# Patient Record
Sex: FEMALE | Race: WHITE
[De-identification: names, ages, dates, MRNs, and addresses within clinical notes are randomized per-mention and may not be internally consistent; named-entity substitution may affect disease eponyms.]

---

## 2019-06-05 ENCOUNTER — HOSPITAL ENCOUNTER (INPATIENT)
Dept: HOSPITAL 56 - MW.ED | Age: 79
LOS: 3 days | Discharge: HOME | DRG: 202 | End: 2019-06-08
Attending: INTERNAL MEDICINE | Admitting: INTERNAL MEDICINE
Payer: COMMERCIAL

## 2019-06-05 DIAGNOSIS — I25.2: ICD-10-CM

## 2019-06-05 DIAGNOSIS — R06.02: ICD-10-CM

## 2019-06-05 DIAGNOSIS — J45.21: Primary | ICD-10-CM

## 2019-06-05 DIAGNOSIS — Z90.89: ICD-10-CM

## 2019-06-05 DIAGNOSIS — R11.0: ICD-10-CM

## 2019-06-05 DIAGNOSIS — K51.90: ICD-10-CM

## 2019-06-05 DIAGNOSIS — I25.10: ICD-10-CM

## 2019-06-05 DIAGNOSIS — Z79.890: ICD-10-CM

## 2019-06-05 DIAGNOSIS — I48.91: ICD-10-CM

## 2019-06-05 DIAGNOSIS — E78.00: ICD-10-CM

## 2019-06-05 DIAGNOSIS — R09.02: ICD-10-CM

## 2019-06-05 DIAGNOSIS — Z79.899: ICD-10-CM

## 2019-06-05 DIAGNOSIS — K21.9: ICD-10-CM

## 2019-06-05 DIAGNOSIS — Z90.49: ICD-10-CM

## 2019-06-05 DIAGNOSIS — I10: ICD-10-CM

## 2019-06-05 DIAGNOSIS — R05: ICD-10-CM

## 2019-06-05 DIAGNOSIS — Z87.891: ICD-10-CM

## 2019-06-05 DIAGNOSIS — E03.9: ICD-10-CM

## 2019-06-05 LAB
CHLORIDE SERPL-SCNC: 101 MMOL/L (ref 98–107)
SODIUM SERPL-SCNC: 138 MMOL/L (ref 136–145)

## 2019-06-05 PROCEDURE — 84443 ASSAY THYROID STIM HORMONE: CPT

## 2019-06-05 PROCEDURE — 96375 TX/PRO/DX INJ NEW DRUG ADDON: CPT

## 2019-06-05 PROCEDURE — 87205 SMEAR GRAM STAIN: CPT

## 2019-06-05 PROCEDURE — 93005 ELECTROCARDIOGRAM TRACING: CPT

## 2019-06-05 PROCEDURE — 85025 COMPLETE CBC W/AUTO DIFF WBC: CPT

## 2019-06-05 PROCEDURE — 80053 COMPREHEN METABOLIC PANEL: CPT

## 2019-06-05 PROCEDURE — 36415 COLL VENOUS BLD VENIPUNCTURE: CPT

## 2019-06-05 PROCEDURE — 71045 X-RAY EXAM CHEST 1 VIEW: CPT

## 2019-06-05 PROCEDURE — A4217 STERILE WATER/SALINE, 500 ML: HCPCS

## 2019-06-05 PROCEDURE — 94640 AIRWAY INHALATION TREATMENT: CPT

## 2019-06-05 PROCEDURE — 87070 CULTURE OTHR SPECIMN AEROBIC: CPT

## 2019-06-05 PROCEDURE — 96376 TX/PRO/DX INJ SAME DRUG ADON: CPT

## 2019-06-05 PROCEDURE — 83735 ASSAY OF MAGNESIUM: CPT

## 2019-06-05 PROCEDURE — 80048 BASIC METABOLIC PNL TOTAL CA: CPT

## 2019-06-05 PROCEDURE — 96372 THER/PROPH/DIAG INJ SC/IM: CPT

## 2019-06-05 PROCEDURE — G0378 HOSPITAL OBSERVATION PER HR: HCPCS

## 2019-06-05 PROCEDURE — 84484 ASSAY OF TROPONIN QUANT: CPT

## 2019-06-05 PROCEDURE — 99285 EMERGENCY DEPT VISIT HI MDM: CPT

## 2019-06-05 PROCEDURE — 96365 THER/PROPH/DIAG IV INF INIT: CPT

## 2019-06-05 PROCEDURE — 83880 ASSAY OF NATRIURETIC PEPTIDE: CPT

## 2019-06-05 RX ADMIN — FLUTICASONE PROPIONATE AND SALMETEROL SCH PUFF: 50; 250 POWDER RESPIRATORY (INHALATION) at 21:34

## 2019-06-05 RX ADMIN — METHYLPREDNISOLONE SODIUM SUCCINATE SCH MG: 125 INJECTION, POWDER, FOR SOLUTION INTRAMUSCULAR; INTRAVENOUS at 18:23

## 2019-06-05 RX ADMIN — DEXTROSE SCH UNITS: 10 SOLUTION INTRAVENOUS at 17:11

## 2019-06-05 NOTE — EDM.PDOC
ED HPI GENERAL MEDICAL PROBLEM





- General


Stated Complaint: SOB


Time Seen by Provider: 06/05/19 12:47


Source of Information: Reports: Patient


History Limitations: Reports: No Limitations





- History of Present Illness


INITIAL COMMENTS - FREE TEXT/NARRATIVE: 


HISTORY AND PHYSICAL:





History of present illness:


Patient is a 79-year-old female who presents to the emergency room today with 

complaints of shortness of breath 9-10 days. She states that she has had a 

productive cough which started out as "thick green mucus and has now turned 

orange". She states she does feel like today is improved. Her shortness of 

breath has "eased up" since taking over-the-counter Mucinex. Mild nausea 

associated with this, which she believes from all the cough drops she's been 

eating. She states she has had asthma attacks in the past, has albuterol 

inhaler (which has not helped alleviate her symptoms). Physical activity 

worsens her symptoms, rest improves her shortness of breath. Patient denies any 

fever, chills, headache, change in vision, syncope or near syncope. Denies any 

chest pain, back pain, abdominal pain, vomiting, diarrhea, constipation or 

dysuria. Has not noted any blood in urine or stool. Patient has been eating and 

drinking appropriately. No history of smoking.





Review of systems: 


As per history of present illness and below otherwise all systems reviewed and 

negative.





Past medical history: 


As per history of present illness and as reviewed below otherwise 

noncontributory.





Surgical history: 


As per history of present illness and as reviewed below otherwise 

noncontributory.





Social history: 


See social history for further information





Family history: 


As per history of present illness and as reviewed below otherwise 

noncontributory.





Physical exam:


General: Well-developed and well-nourished 79-year-old female. Alert and 

oriented. Nontoxic appearing and in no acute distress.


HEENT: Atraumatic, normocephalic, pupils equal and reactive bilaterally, 

negative for conjunctival pallor or scleral icterus, mucous membranes moist, 

trachea midline. No drooling or trismus noted. No meningeal signs. No hot 

potato voice noted. 


Lungs: Expiratory wheezing noted to the anterior upper airways bilaterally with 

diminished lower bases, breath sounds equal bilaterally, chest nontender. Dry 

harsh cough noted. 


Heart: S1S2, regular rate and rhythm without overt murmur


Abdomen: Soft, nondistended, nontender. Negative for masses or 

hepatosplenomegaly. Negative for costovertebral tenderness.


Pelvis: Stable nontender.


Skin: Intact, warm, dry. No lesions or rashes noted.


Extremities: Atraumatic, moves all extremities per self without difficulty or 

deficits, negative for cords or calf pain. Neurovascular unremarkable.


Neuro: Awake, alert, oriented. Cranial nerves II through XII unremarkable. 

Cerebellum unremarkable. Motor and sensory unremarkable throughout. Exam 

nonfocal.





Notes:


Upon arrival patient is 88% on room air. She does not use home oxygen. She is 

agreeable to lab work and chest x-ray.


Patient did feel some improvement after DuoNeb and Solu-Medrol IV. Lab work has 

returned and has been reviewed by me. Patient's oxygen saturation remains 88-89

% on room air. With 2 L per nasal cannula she does pop to 95%. She is agreeable 

staying for observation. Dr Ferguson was consulted on this case and agreeable to 

observe her.





Diagnostics:


CBC, CMP, troponin, EKG, one view chest, BNP





Therapeutics:


DuoNeb, Solu-Medrol, Levaquin, Phenergan w/ Codeine





Impression: 


Hypoxia


Infiltrate, left lower lobe





Plan:


Observation admission to Med/Surg





Definitive disposition and diagnosis as appropriate pending reevaluation and 

review of above.








- Related Data


 Allergies











Allergy/AdvReac Type Severity Reaction Status Date / Time


 


No Known Allergies Allergy   Verified 06/05/19 12:52











Home Meds: 


 Home Meds





Albuterol [Proair HFA] 2 inhalation IH Q4H PRN 09/10/14 [History]


Atenolol 50 mg PO DAILY 09/10/14 [History]


Citalopram [Citalopram HBr] 20 mg PO DAILY 09/10/14 [History]


Hyoscyamine [Hyomax-SL] 1 tab PO Q8H PRN 06/05/19 [History]


Levothyroxine 25 mcg PO DAILY 06/05/19 [History]


Losartan [Cozaar] 100 mg PO DAILY 06/05/19 [History]


Pantoprazole [ProTONIX***] 40 mg PO DAILY 06/05/19 [History]











ED ROS GENERAL





- Review of Systems


Review Of Systems: ROS reveals no pertinent complaints other than HPI.





ED EXAM, GENERAL





- Physical Exam


Exam: See Below (See dictation)





Course





- Vital Signs


Last Recorded V/S: 


 Last Vital Signs











Temp  98.4 F   06/05/19 12:49


 


Pulse  78   06/05/19 12:49


 


Resp  22 H  06/05/19 12:49


 


BP  152/107 H  06/05/19 12:49


 


Pulse Ox  97   06/05/19 13:07














- Orders/Labs/Meds


Orders: 


 Active Orders 24 hr











 Category Date Time Status


 


 EKG Documentation Completion [RC] STAT Care  06/05/19 12:46 Active


 


 RT Aerosol Therapy [RC] ASDIRECTED Care  06/05/19 12:53 Active


 


 RT Aerosol Therapy [RC] ASDIRECTED Care  06/05/19 13:11 Active


 


 B-TYPE NATRIURETIC PEPTIDE,BNP [CHEM] Stat Lab  06/05/19 12:58 Received


 


 Levofloxacin/Dextrose 5%-Water [Levaquin in D5W 750 MG/ Med  06/05/19 13:59 

Active





 150 ML] 750 mg   





 Premix Bag 1 bag   





 IV ONETIME   


 


 Sodium Chloride 0.9% [Saline Flush] Med  06/05/19 12:46 Active





 10 ml FLUSH ASDIRECTED PRN   


 


 Sodium Chloride 0.9% [Saline Flush] Med  06/05/19 12:46 Active





 2.5 ml FLUSH ASDIRECTED PRN   


 


 Saline Lock Insert [OM.PC] Stat Oth  06/05/19 12:46 Ordered








 Medication Orders





Levofloxacin/Dextrose 750 mg/ (Premix)  150 mls @ 100 mls/hr IV ONETIME ONE


   Stop: 06/05/19 15:28


Sodium Chloride (Saline Flush)  10 ml FLUSH ASDIRECTED PRN


   PRN Reason: Keep Vein Open


Sodium Chloride (Saline Flush)  2.5 ml FLUSH ASDIRECTED PRN


   PRN Reason: Keep Vein Open








Labs: 


 Laboratory Tests











  06/05/19 06/05/19 Range/Units





  12:58 12:58 


 


WBC  11.18 H   (4.0-11.0)  K/uL


 


RBC  4.04 L   (4.30-5.90)  M/uL


 


Hgb  12.1   (12.0-16.0)  g/dL


 


Hct  38.0   (36.0-46.0)  %


 


MCV  94.1   (80.0-98.0)  fL


 


MCH  30.0   (27.0-32.0)  pg


 


MCHC  31.8   (31.0-37.0)  g/dL


 


RDW Std Deviation  44.1   (28.0-62.0)  fl


 


RDW Coeff of Kit  13   (11.0-15.0)  %


 


Plt Count  225   (150-400)  K/uL


 


MPV  9.60   (7.40-12.00)  fL


 


Neut % (Auto)  79.9   (48.0-80.0)  %


 


Lymph % (Auto)  8.1 L   (16.0-40.0)  %


 


Mono % (Auto)  9.7   (0.0-15.0)  %


 


Eos % (Auto)  2.0   (0.0-7.0)  %


 


Baso % (Auto)  0.3   (0.0-1.5)  %


 


Neut # (Auto)  8.9 H   (1.4-5.7)  K/uL


 


Lymph # (Auto)  0.9   (0.6-2.4)  K/uL


 


Mono # (Auto)  1.1 H   (0.0-0.8)  K/uL


 


Eos # (Auto)  0.2   (0.0-0.7)  K/uL


 


Baso # (Auto)  0.0   (0.0-0.1)  K/uL


 


Nucleated RBC %  0.0   /100WBC


 


Nucleated RBCs #  0   K/uL


 


Sodium   138  (136-145)  mmol/L


 


Potassium   3.6  (3.5-5.1)  mmol/L


 


Chloride   101  ()  mmol/L


 


Carbon Dioxide   28.6  (21.0-32.0)  mmol/L


 


BUN   13  (7.0-18.0)  mg/dL


 


Creatinine   0.9  (0.6-1.0)  mg/dL


 


Est Cr Clr Drug Dosing   46.36  mL/min


 


Estimated GFR (MDRD)   > 60.0  ml/min


 


Glucose   112 H  ()  mg/dL


 


Calcium   9.3  (8.5-10.1)  mg/dL


 


Total Bilirubin   0.4  (0.2-1.0)  mg/dL


 


AST   13 L  (15-37)  IU/L


 


ALT   13 L  (14-63)  IU/L


 


Alkaline Phosphatase   72  ()  U/L


 


Troponin I   < 0.050  (0.000-0.056)  ng/mL


 


Total Protein   7.2  (6.4-8.2)  g/dL


 


Albumin   2.7 L  (3.4-5.0)  g/dL


 


Globulin   4.5 H  (2.6-4.0)  g/dL


 


Albumin/Globulin Ratio   0.6 L  (0.9-1.6)  











Meds: 


Medications











Generic Name Dose Route Start Last Admin





  Trade Name Jonathanq  PRN Reason Stop Dose Admin


 


Levofloxacin/Dextrose 750 mg/  150 mls @ 100 mls/hr  06/05/19 13:59  





  Premix  IV  06/05/19 15:28  





  ONETIME ONE   





     





     





     





     


 


Sodium Chloride  10 ml  06/05/19 12:46  





  Saline Flush  FLUSH   





  ASDIRECTED PRN   





  Keep Vein Open   





     





     





     


 


Sodium Chloride  2.5 ml  06/05/19 12:46  





  Saline Flush  FLUSH   





  ASDIRECTED PRN   





  Keep Vein Open   





     





     





     














Discontinued Medications














Generic Name Dose Route Start Last Admin





  Trade Name Jonathanq  PRN Reason Stop Dose Admin


 


Albuterol/Ipratropium  3 ml  06/05/19 12:53  06/05/19 13:06





  Duoneb 3.0-0.5 Mg/3 Ml  NEB  06/05/19 12:54  3 ml





  ONETIME ONE   Administration





     





     





     





     


 


Albuterol/Ipratropium  3 ml  06/05/19 13:11  06/05/19 13:16





  Duoneb 3.0-0.5 Mg/3 Ml  NEB  06/05/19 13:12  3 ml





  ONETIME ONE   Administration





     





     





     





     


 


Albuterol/Ipratropium  Confirm  06/05/19 13:12  06/05/19 13:15





  Duoneb 3.0-0.5 Mg/3 Ml  Administered  06/05/19 13:13  Not Given





  Dose   





  3 ml   





  .ROUTE   





  .STK-MED ONE   





     





     





     





     


 


Methylprednisolone Sodium Succinate  125 mg  06/05/19 12:53  06/05/19 13:10





  Solu-Medrol  IVPUSH  06/05/19 12:54  125 mg





  ONETIME ONE   Administration





     





     





     





     


 


Promethazine HCl/Codeine  5 ml  06/05/19 13:59  





  Phenergan With Codeine  PO  06/05/19 14:00  





  NOW STA   





     





     





     





     














Departure





- Departure


Time of Disposition: 14:26


Disposition: Refer to Observation


Clinical Impression: 


 Hypoxia, Infiltrate of left lung present on chest x-ray





Referrals: 


PCP,None [Primary Care Provider] - 





- My Orders


Last 24 Hours: 


My Active Orders





06/05/19 12:46


EKG Documentation Completion [RC] STAT 


Sodium Chloride 0.9% [Saline Flush]   10 ml FLUSH ASDIRECTED PRN 


Sodium Chloride 0.9% [Saline Flush]   2.5 ml FLUSH ASDIRECTED PRN 


Saline Lock Insert [OM.PC] Stat 





06/05/19 12:53


RT Aerosol Therapy [RC] ASDIRECTED 





06/05/19 12:58


B-TYPE NATRIURETIC PEPTIDE,BNP [CHEM] Stat 





06/05/19 13:11


RT Aerosol Therapy [RC] ASDIRECTED 





06/05/19 13:59


Levofloxacin/Dextrose 5%-Water [Levaquin in D5W 750 MG/150 ML] 750 mg   Premix 

Bag 1 bag IV ONETIME 














- Assessment/Plan


Last 24 Hours: 


My Active Orders





06/05/19 12:46


EKG Documentation Completion [RC] STAT 


Sodium Chloride 0.9% [Saline Flush]   10 ml FLUSH ASDIRECTED PRN 


Sodium Chloride 0.9% [Saline Flush]   2.5 ml FLUSH ASDIRECTED PRN 


Saline Lock Insert [OM.PC] Stat 





06/05/19 12:53


RT Aerosol Therapy [RC] ASDIRECTED 





06/05/19 12:58


B-TYPE NATRIURETIC PEPTIDE,BNP [CHEM] Stat 





06/05/19 13:11


RT Aerosol Therapy [RC] ASDIRECTED 





06/05/19 13:59


Levofloxacin/Dextrose 5%-Water [Levaquin in D5W 750 MG/150 ML] 750 mg   Premix 

Bag 1 bag IV ONETIME

## 2019-06-05 NOTE — PCM.HP
H&P History of Present Illness





- General


Date of Service: 06/05/19


Admit Problem/Dx: 


 Admission Diagnosis/Problem





Admission Diagnosis/Problem      Hypoxia








Source of Information: Patient


History Limitations: Reports: No Limitations





- History of Present Illness


Initial Comments - Free Text/Narative: 





79 year old female with history of asthma, HTN, GERD, hypothyroidism, 

ulcerative colitis presented to ED with complaints of shortness of breath and 

productive cough for past several days.  On presentation her oxygen saturation 

was at 88% on RA and an elevated BP of 150s/100.  She feels her SOB started 

when she was exposed to cat dander after visiting her son recently.  She states 

her granddaughters washed her clothes for her and when it was returned it was 

full of cat hair.  Shortly after getting the clothes back she started noticing 

she was becoming more short of breath and had a cough that produced thick green 

sputum.  She states the sputum is now pale yellow in color.  She had taken 

mucinex, which she feels has helped with her breathing.  She currently does not 

take any daily medication for her asthma, but does have an albuterol inhaler.  

She states she had not used it for six months and did not even bring it on this 

trip.  Patient denies chest pain, fever, chills, and abdominal pain.  She does 

state she has some chest/back pain that she relates to her cough.  She reports 

history of smoking when she was younger, but has not smoked for many years, no 

alcohol use. 





In the ED diagnostic testing was completed that showed a slightly elevated WBC 

of 11.18, BMP was within normal limits, and CXR was negative.  Patient was 

treated with Duoneb, Solu-Medrol, and Levaquin while in the ED. IVFs were also 

initiated.  Placed on 3L oxygen via NC and oxygen saturation improved to 97%. 

She will be admitted observation for acute asthma exacerbation and possible 

bronchitis


 














- Related Data


Allergies/Adverse Reactions: 


 Allergies











Allergy/AdvReac Type Severity Reaction Status Date / Time


 


No Known Allergies Allergy   Verified 06/05/19 12:52











Home Medications: 


 Home Meds





Albuterol [Proair HFA] 2 inhalation IH Q4H PRN 09/10/14 [History]


Atenolol 50 mg PO DAILY 09/10/14 [History]


Citalopram [Citalopram HBr] 20 mg PO DAILY 09/10/14 [History]


Hyoscyamine [Hyomax-SL] 1 tab PO Q8H PRN 06/05/19 [History]


Levothyroxine 25 mcg PO DAILY 06/05/19 [History]


Losartan [Cozaar] 100 mg PO DAILY 06/05/19 [History]


Pantoprazole [ProTONIX***] 40 mg PO DAILY 06/05/19 [History]











Past Medical History


Cardiovascular History: Reports: CAD, High Cholesterol, Hypertension, MI.  

Denies: Afib, Blood Clots/VTE/DVT


Respiratory History: Reports: Asthma.  Denies: PE


Gastrointestinal History: Reports: GERD, Inflammatory Bowel Disease (Ulcerative 

colitis)


Genitourinary History: Reports: None.  Denies: Chronic Renal Insuffiency


OB/GYN History: Reports: Pregnancy


Neurological History: Reports: None.  Denies: CVA, TIA


Psychiatric History: Reports: Anxiety


Endocrine/Metabolic History: Reports: None.  Denies: Diabetes, Type II





- Past Surgical History


GI Surgical History: Reports: Appendectomy, Cholecystectomy


Female  Surgical History: Reports: Hysterectomy





Social & Family History





- Family History


Family Medical History: Noncontributory





- Tobacco Use


Smoking Status *Q: Former Smoker


Used Tobacco, but Quit: Yes


Month/Year Tobacco Last Used: 50+ years ago





- Alcohol Use


Alcohol Use History: No





- Recreational Drug Use


Recreational Drug Use: No





- Living Situation & Occupation


Living situation: Reports: 


Occupation: Retired





H&P Review of Systems





- Review of Systems:


Review Of Systems: See Below


General: Reports: No Symptoms.  Denies: Fever, Chills, Malaise, Weakness


HEENT: Denies: Headaches, Sinus Congestion, Sore Throat


Pulmonary: Reports: Shortness of Breath, Wheezing, Pleuritic Chest Pain, Cough, 

Sputum (dark green to pale yellow now).  Denies: Hemoptysis


Cardiovascular: Reports: No Symptoms.  Denies: Chest Pain, Edema


Gastrointestinal: Reports: No Symptoms, Diarrhea (baseline with UC).  Denies: 

Abdominal Pain, Black Stool, Bloody Stool, Decreased Appetite, Nausea


Genitourinary: Reports: No Symptoms.  Denies: Dysuria, Frequency, Burning


Skin: Reports: No Symptoms


Neurological: Reports: No Symptoms


Hematologic/Lymphatic: Reports: No Symptoms


Immunologic: Reports: No Symptoms





Exam





- Exam


Exam: See Below





- Vital Signs


Vital Signs: 


 Last Vital Signs











Temp  98.4 F   06/05/19 12:49


 


Pulse  78   06/05/19 12:49


 


Resp  22 H  06/05/19 12:49


 


BP  152/107 H  06/05/19 12:49


 


Pulse Ox  97   06/05/19 13:07











Weight: 95.254 kg





- Exam


General: Alert, Oriented, Cooperative


HEENT: Conjunctiva Clear, Mucosa Moist & Pink, Pupils Reactive


Lungs: Rhonchi, Wheezing (inspiratory and expiratory).  No: Normal Respiratory 

Effort (dyspneic with speech)


Cardiovascular: Regular Rate, Regular Rhythm, Normal S1, Normal S2.  No: 

Systolic Murmur


GI/Abdominal Exam: Normal Bowel Sounds, Soft, Non-Tender


Extremities: Normal Inspection, Normal Range of Motion, Non-Tender, No Pedal 

Edema


Neuro Extensive - Mental Status: Alert, Oriented x3


Psychiatric: Alert, Normal Affect, Normal Mood





- Patient Data


Lab Results Last 24 hrs: 


 Laboratory Results - last 24 hr











  06/05/19 06/05/19 06/05/19 Range/Units





  12:58 12:58 12:58 


 


WBC  11.18 H    (4.0-11.0)  K/uL


 


RBC  4.04 L    (4.30-5.90)  M/uL


 


Hgb  12.1    (12.0-16.0)  g/dL


 


Hct  38.0    (36.0-46.0)  %


 


MCV  94.1    (80.0-98.0)  fL


 


MCH  30.0    (27.0-32.0)  pg


 


MCHC  31.8    (31.0-37.0)  g/dL


 


RDW Std Deviation  44.1    (28.0-62.0)  fl


 


RDW Coeff of Kit  13    (11.0-15.0)  %


 


Plt Count  225    (150-400)  K/uL


 


MPV  9.60    (7.40-12.00)  fL


 


Neut % (Auto)  79.9    (48.0-80.0)  %


 


Lymph % (Auto)  8.1 L    (16.0-40.0)  %


 


Mono % (Auto)  9.7    (0.0-15.0)  %


 


Eos % (Auto)  2.0    (0.0-7.0)  %


 


Baso % (Auto)  0.3    (0.0-1.5)  %


 


Neut # (Auto)  8.9 H    (1.4-5.7)  K/uL


 


Lymph # (Auto)  0.9    (0.6-2.4)  K/uL


 


Mono # (Auto)  1.1 H    (0.0-0.8)  K/uL


 


Eos # (Auto)  0.2    (0.0-0.7)  K/uL


 


Baso # (Auto)  0.0    (0.0-0.1)  K/uL


 


Nucleated RBC %  0.0    /100WBC


 


Nucleated RBCs #  0    K/uL


 


Sodium   138   (136-145)  mmol/L


 


Potassium   3.6   (3.5-5.1)  mmol/L


 


Chloride   101   ()  mmol/L


 


Carbon Dioxide   28.6   (21.0-32.0)  mmol/L


 


BUN   13   (7.0-18.0)  mg/dL


 


Creatinine   0.9   (0.6-1.0)  mg/dL


 


Est Cr Clr Drug Dosing   46.36   mL/min


 


Estimated GFR (MDRD)   > 60.0   ml/min


 


Glucose   112 H   ()  mg/dL


 


Calcium   9.3   (8.5-10.1)  mg/dL


 


Total Bilirubin   0.4   (0.2-1.0)  mg/dL


 


AST   13 L   (15-37)  IU/L


 


ALT   13 L   (14-63)  IU/L


 


Alkaline Phosphatase   72   ()  U/L


 


Troponin I   < 0.050   (0.000-0.056)  ng/mL


 


B-Natriuretic Peptide    386 H  (<100)  PG/ML


 


Total Protein   7.2   (6.4-8.2)  g/dL


 


Albumin   2.7 L   (3.4-5.0)  g/dL


 


Globulin   4.5 H   (2.6-4.0)  g/dL


 


Albumin/Globulin Ratio   0.6 L   (0.9-1.6)  











Result Diagrams: 


 06/05/19 12:58





 06/05/19 12:58





- Problem List


(1) Asthma exacerbation


SNOMED Code(s): 426396978


   ICD Code: J45.901 - UNSPECIFIED ASTHMA WITH (ACUTE) EXACERBATION   Status: 

Acute   Current Visit: Yes   


Qualifiers: 


   Asthma severity: mild   Asthma persistence: intermittent   Qualified Code(s)

: J45.21 - Mild intermittent asthma with (acute) exacerbation   





(2) Hypoxia


SNOMED Code(s): 017923797


   ICD Code: R09.02 - HYPOXEMIA   Status: Acute   Current Visit: Yes   





(3) HTN (hypertension)


SNOMED Code(s): 61075079


   ICD Code: I10 - ESSENTIAL (PRIMARY) HYPERTENSION   Status: Chronic   Current 

Visit: Yes   


Qualifiers: 


   Hypertension type: essential hypertension   Qualified Code(s): I10 - 

Essential (primary) hypertension   





(4) Hypothyroidism


SNOMED Code(s): 76537383


   ICD Code: E03.9 - HYPOTHYROIDISM, UNSPECIFIED   Status: Chronic   Current 

Visit: Yes   





(5) Ulcerative colitis


SNOMED Code(s): 10071123


   ICD Code: K51.90 - ULCERATIVE COLITIS, UNSPECIFIED, WITHOUT COMPLICATIONS   

Status: Chronic   Current Visit: Yes   


Problem List Initiated/Reviewed/Updated: Yes


Orders Last 24hrs: 


 Active Orders 24 hr











 Category Date Time Status


 


 Admission Status [Patient Status] [ADT] Stat ADT  06/05/19 14:27 Active


 


 EKG Documentation Completion [RC] STAT Care  06/05/19 12:46 Active


 


 RT Aerosol Therapy [RC] ASDIRECTED Care  06/05/19 12:53 Active


 


 RT Aerosol Therapy [RC] ASDIRECTED Care  06/05/19 13:11 Active


 


 CULTURE SPUTUM + SMEAR [RM] Stat Lab  06/05/19 14:32 Received


 


 Sodium Chloride 0.9% [Saline Flush] Med  06/05/19 12:46 Active





 10 ml FLUSH ASDIRECTED PRN   


 


 Sodium Chloride 0.9% [Saline Flush] Med  06/05/19 12:46 Active





 2.5 ml FLUSH ASDIRECTED PRN   


 


 Saline Lock Insert [OM.PC] Stat Oth  06/05/19 12:46 Ordered








 Medication Orders





Sodium Chloride (Saline Flush)  10 ml FLUSH ASDIRECTED PRN


   PRN Reason: Keep Vein Open


Sodium Chloride (Saline Flush)  2.5 ml FLUSH ASDIRECTED PRN


   PRN Reason: Keep Vein Open








Assessment/Plan Comment:: 





79 year old female admitted with  Acute asthma exacerbation and possible 

bronchitis





1. Acute asthma exacerbation:  Will continue treatment with Duoneb, Levaquin 

750mg IV daily, and Solu-Medrol 80 mg IV Q6hrs. Start Advair 250/50. Consult RT 

for asthma education/action plan .Oxygen therapy PRN to keep sats 90% or 

higher. Wean off as possible. 





2. HTN: Elevated on arrival, will monitor. Continue home medications, Atenolol 

and Losartan.





3. Hypothyroidism: Stable, continue Levothyroxine





4. Ulcerative Colitis: Stable, recently had polyps removed during bi annual 

colonoscopy. 





VTE prophylaxis: Heparin 5000 units subcutaneously BID





Dispo: 1-2 days 





Patient would like to be placed as a DNR for code status.

## 2019-06-05 NOTE — CR
INDICATION:



Shortness of breath.



COMPARISON:



Portable AP chest September 10, 2014.



TECHNIQUE:



Portable AP chest.



FINDINGS:



Normal size cardiac silhouette. Clear lung fields with no evidence of acute 

pneumonic infiltrates or CHF. No pneumothorax or pleural effusion.



IMPRESSION:



Negative chest.



Dictated by Tarik Mcclelland MD @ Jun 5 2019  2:07PM



Signed by Dr. Tarik Mcclelland @ Jun 5 2019  2:08PM

## 2019-06-06 LAB
CHLORIDE SERPL-SCNC: 102 MMOL/L (ref 98–107)
SODIUM SERPL-SCNC: 138 MMOL/L (ref 136–145)

## 2019-06-06 RX ADMIN — METHYLPREDNISOLONE SODIUM SUCCINATE SCH MG: 125 INJECTION, POWDER, FOR SOLUTION INTRAMUSCULAR; INTRAVENOUS at 13:29

## 2019-06-06 RX ADMIN — DEXTROSE SCH UNITS: 10 SOLUTION INTRAVENOUS at 05:12

## 2019-06-06 RX ADMIN — DEXTROSE SCH UNITS: 10 SOLUTION INTRAVENOUS at 16:03

## 2019-06-06 RX ADMIN — METHYLPREDNISOLONE SODIUM SUCCINATE SCH MG: 125 INJECTION, POWDER, FOR SOLUTION INTRAMUSCULAR; INTRAVENOUS at 01:20

## 2019-06-06 RX ADMIN — METHYLPREDNISOLONE SODIUM SUCCINATE SCH MG: 125 INJECTION, POWDER, FOR SOLUTION INTRAMUSCULAR; INTRAVENOUS at 18:52

## 2019-06-06 RX ADMIN — FLUTICASONE PROPIONATE AND SALMETEROL SCH: 50; 250 POWDER RESPIRATORY (INHALATION) at 22:51

## 2019-06-06 RX ADMIN — FLUTICASONE PROPIONATE AND SALMETEROL SCH PUFF: 50; 250 POWDER RESPIRATORY (INHALATION) at 09:59

## 2019-06-06 RX ADMIN — METHYLPREDNISOLONE SODIUM SUCCINATE SCH MG: 125 INJECTION, POWDER, FOR SOLUTION INTRAMUSCULAR; INTRAVENOUS at 06:08

## 2019-06-06 NOTE — PCM.PN
- General Info


Date of Service: 06/06/19


Admission Dx/Problem (Free Text): 


 Admission Diagnosis/Problem





Admission Diagnosis/Problem      Hypoxia








Subjective Update: 





Feeling improved, but still not 100%. No chest pain, reports dyspnea still, 

productive cough. 


Functional Status: Reports: Pain Controlled, Tolerating Diet, Ambulating, 

Urinating





- Review of Systems


General: Reports: Malaise.  Denies: Fever


HEENT: Reports: No Symptoms.  Denies: Headaches, Sore Throat


Pulmonary: Reports: Shortness of Breath, Pleuritic Chest Pain, Cough, Sputum, 

Wheezing


Cardiovascular: Reports: No Symptoms.  Denies: Chest Pain


Gastrointestinal: Reports: No Symptoms.  Denies: Abdominal Pain, Nausea, 

Vomiting


Genitourinary: Reports: No Symptoms.  Denies: Dysuria, Frequency, Burning


Musculoskeletal: Reports: No Symptoms


Skin: Reports: No Symptoms


Neurological: Reports: No Symptoms


Psychiatric: Reports: No Symptoms





- Patient Data


Vitals - Most Recent: 


 Last Vital Signs











Temp  98.3 F   06/06/19 07:42


 


Pulse  103 H  06/06/19 08:39


 


Resp  18   06/06/19 07:42


 


BP  159/89 H  06/06/19 08:39


 


Pulse Ox  94 L  06/06/19 07:42











Weight - Most Recent: 95.254 kg


I&O - Last 24 Hours: 


 Intake & Output











 06/05/19 06/06/19 06/06/19





 22:59 06:59 14:59


 


Intake Total 100 480 


 


Output Total 0 500 


 


Balance 100 -20 











Lab Results Last 24 Hours: 


 Laboratory Results - last 24 hr











  06/05/19 06/05/19 06/05/19 Range/Units





  12:58 12:58 12:58 


 


WBC  11.18 H    (4.0-11.0)  K/uL


 


RBC  4.04 L    (4.30-5.90)  M/uL


 


Hgb  12.1    (12.0-16.0)  g/dL


 


Hct  38.0    (36.0-46.0)  %


 


MCV  94.1    (80.0-98.0)  fL


 


MCH  30.0    (27.0-32.0)  pg


 


MCHC  31.8    (31.0-37.0)  g/dL


 


RDW Std Deviation  44.1    (28.0-62.0)  fl


 


RDW Coeff of Kit  13    (11.0-15.0)  %


 


Plt Count  225    (150-400)  K/uL


 


MPV  9.60    (7.40-12.00)  fL


 


Neut % (Auto)  79.9    (48.0-80.0)  %


 


Lymph % (Auto)  8.1 L    (16.0-40.0)  %


 


Mono % (Auto)  9.7    (0.0-15.0)  %


 


Eos % (Auto)  2.0    (0.0-7.0)  %


 


Baso % (Auto)  0.3    (0.0-1.5)  %


 


Neut # (Auto)  8.9 H    (1.4-5.7)  K/uL


 


Lymph # (Auto)  0.9    (0.6-2.4)  K/uL


 


Mono # (Auto)  1.1 H    (0.0-0.8)  K/uL


 


Eos # (Auto)  0.2    (0.0-0.7)  K/uL


 


Baso # (Auto)  0.0    (0.0-0.1)  K/uL


 


Add Manual Diff     


 


Neutrophils % (Manual)     (48.0-80.0)  %


 


Band Neutrophils %     %


 


Lymphocytes % (Manual)     (16.0-40.0)  %


 


Monocytes % (Manual)     (0.0-15.0)  %


 


Nucleated RBC %  0.0    /100WBC


 


Absolute Seg Neuts     (1.4-5.7)  


 


Band Neutrophils #     


 


Lymphocytes # (Manual)     (0.6-2.4)  


 


Monocytes # (Manual)     (0.0-0.8)  


 


Nucleated RBCs #  0    K/uL


 


Sodium   138   (136-145)  mmol/L


 


Potassium   3.6   (3.5-5.1)  mmol/L


 


Chloride   101   ()  mmol/L


 


Carbon Dioxide   28.6   (21.0-32.0)  mmol/L


 


BUN   13   (7.0-18.0)  mg/dL


 


Creatinine   0.9   (0.6-1.0)  mg/dL


 


Est Cr Clr Drug Dosing   46.36   mL/min


 


Estimated GFR (MDRD)   > 60.0   ml/min


 


Glucose   112 H   ()  mg/dL


 


Calcium   9.3   (8.5-10.1)  mg/dL


 


Total Bilirubin   0.4   (0.2-1.0)  mg/dL


 


AST   13 L   (15-37)  IU/L


 


ALT   13 L   (14-63)  IU/L


 


Alkaline Phosphatase   72   ()  U/L


 


Troponin I   < 0.050   (0.000-0.056)  ng/mL


 


B-Natriuretic Peptide    386 H  (<100)  PG/ML


 


Total Protein   7.2   (6.4-8.2)  g/dL


 


Albumin   2.7 L   (3.4-5.0)  g/dL


 


Globulin   4.5 H   (2.6-4.0)  g/dL


 


Albumin/Globulin Ratio   0.6 L   (0.9-1.6)  














  06/06/19 06/06/19 Range/Units





  06:00 06:00 


 


WBC  10.96   (4.0-11.0)  K/uL


 


RBC  3.92 L   (4.30-5.90)  M/uL


 


Hgb  11.7 L   (12.0-16.0)  g/dL


 


Hct  36.7   (36.0-46.0)  %


 


MCV  93.6   (80.0-98.0)  fL


 


MCH  29.8   (27.0-32.0)  pg


 


MCHC  31.9   (31.0-37.0)  g/dL


 


RDW Std Deviation  43.0   (28.0-62.0)  fl


 


RDW Coeff of Kit  13   (11.0-15.0)  %


 


Plt Count  234   (150-400)  K/uL


 


MPV  9.80   (7.40-12.00)  fL


 


Neut % (Auto)    (48.0-80.0)  %


 


Lymph % (Auto)    (16.0-40.0)  %


 


Mono % (Auto)    (0.0-15.0)  %


 


Eos % (Auto)    (0.0-7.0)  %


 


Baso % (Auto)    (0.0-1.5)  %


 


Neut # (Auto)    (1.4-5.7)  K/uL


 


Lymph # (Auto)    (0.6-2.4)  K/uL


 


Mono # (Auto)    (0.0-0.8)  K/uL


 


Eos # (Auto)    (0.0-0.7)  K/uL


 


Baso # (Auto)    (0.0-0.1)  K/uL


 


Add Manual Diff  YES   


 


Neutrophils % (Manual)  80   (48.0-80.0)  %


 


Band Neutrophils %  15   %


 


Lymphocytes % (Manual)  4 L   (16.0-40.0)  %


 


Monocytes % (Manual)  1   (0.0-15.0)  %


 


Nucleated RBC %  0.0   /100WBC


 


Absolute Seg Neuts  8.8 H   (1.4-5.7)  


 


Band Neutrophils #  1.6   


 


Lymphocytes # (Manual)  0.4 L   (0.6-2.4)  


 


Monocytes # (Manual)  0.1   (0.0-0.8)  


 


Nucleated RBCs #  0   K/uL


 


Sodium   138  (136-145)  mmol/L


 


Potassium   3.6  (3.5-5.1)  mmol/L


 


Chloride   102  ()  mmol/L


 


Carbon Dioxide   26.5  (21.0-32.0)  mmol/L


 


BUN   20 H  (7.0-18.0)  mg/dL


 


Creatinine   1.2 H  (0.6-1.0)  mg/dL


 


Est Cr Clr Drug Dosing   34.77  mL/min


 


Estimated GFR (MDRD)   43.4  ml/min


 


Glucose   208 H  ()  mg/dL


 


Calcium   9.3  (8.5-10.1)  mg/dL


 


Total Bilirubin    (0.2-1.0)  mg/dL


 


AST    (15-37)  IU/L


 


ALT    (14-63)  IU/L


 


Alkaline Phosphatase    ()  U/L


 


Troponin I    (0.000-0.056)  ng/mL


 


B-Natriuretic Peptide    (<100)  PG/ML


 


Total Protein    (6.4-8.2)  g/dL


 


Albumin    (3.4-5.0)  g/dL


 


Globulin    (2.6-4.0)  g/dL


 


Albumin/Globulin Ratio    (0.9-1.6)  











Robert Results Last 24 Hours: 


 Microbiology











 06/05/19 14:32 Gram Stain - Preliminary





 Sputum - Expectorated 











Med Orders - Current: 


 Current Medications





Acetaminophen (Tylenol)  650 mg PO Q4H PRN


   PRN Reason: Pain (mild 1-3)


Albuterol/Ipratropium (Duoneb 3.0-0.5 Mg/3 Ml)  3 ml NEB Q4HRRT [facility]


   Last Admin: 06/06/19 09:59 Dose:  3 ml


Atenolol (Tenormin)  50 mg PO DAILY [facility]


   Last Admin: 06/06/19 08:39 Dose:  50 mg


Benzonatate (Tessalon Perles)  100 mg PO TID PRN


   PRN Reason: Cough


Citalopram Hydrobromide (Celexa)  20 mg PO DAILY [facility]


   Last Admin: 06/06/19 08:39 Dose:  20 mg


Heparin Sodium (Porcine) (Heparin Sodium)  5,000 units SUBCUT Q12H [facility]


   Last Admin: 06/06/19 05:12 Dose:  5,000 units


Hyoscyamine (Hyomax-Sl)  0.125 mg PO Q8H PRN


   PRN Reason: Abdominal Pain


Levofloxacin/Dextrose 750 mg/ (Premix)  150 mls @ 100 mls/hr IV Q48H [facility]


Sodium Chloride (Normal Saline)  1,000 mls @ 100 mls/hr IV ASDIRECTED [facility]


Levothyroxine Sodium (Levothyroxine)  25 mcg PO ACBREAKFAST [facility]


   Last Admin: 06/06/19 08:40 Dose:  25 mcg


Losartan Potassium (Cozaar)  100 mg PO DAILY [facility]


   Last Admin: 06/06/19 08:38 Dose:  100 mg


Methylprednisolone Sodium Succinate (Solu-Medrol)  80 mg IVPUSH Q6H [facility]


   Last Admin: 06/06/19 06:08 Dose:  80 mg


Ondansetron HCl (Zofran Odt)  4 mg PO Q4H PRN


   PRN Reason: nausea, able to take PO


Pantoprazole Sodium (Protonix***)  40 mg PO DAILY@0700 [facility]


   Last Admin: 06/06/19 06:07 Dose:  40 mg


Fluticasone/Salmeterol (Advair Diskus 250-50)  1 puff INH BID [facility]


   Last Admin: 06/06/19 09:59 Dose:  1 puff


Sodium Chloride (Saline Flush)  10 ml FLUSH ASDIRECTED PRN


   PRN Reason: Keep Vein Open


Sodium Chloride (Saline Flush)  2.5 ml FLUSH ASDIRECTED PRN


   PRN Reason: Keep Vein Open





Discontinued Medications





Albuterol/Ipratropium (Duoneb 3.0-0.5 Mg/3 Ml)  3 ml NEB ONETIME ONE


   Stop: 06/05/19 12:54


   Last Admin: 06/05/19 13:06 Dose:  3 ml


Albuterol/Ipratropium (Duoneb 3.0-0.5 Mg/3 Ml)  3 ml NEB ONETIME ONE


   Stop: 06/05/19 13:12


   Last Admin: 06/05/19 13:16 Dose:  3 ml


Albuterol/Ipratropium (Duoneb 3.0-0.5 Mg/3 Ml) Confirm Administered Dose 3 ml 

.ROUTE .STK-MED ONE


   Stop: 06/05/19 13:13


   Last Admin: 06/05/19 13:15 Dose:  Not Given


Levofloxacin/Dextrose 750 mg/ (Premix)  150 mls @ 100 mls/hr IV ONETIME ONE


   Stop: 06/05/19 15:28


   Last Admin: 06/05/19 14:44 Dose:  100 mls/hr


Methylprednisolone Sodium Succinate (Solu-Medrol)  125 mg IVPUSH ONETIME ONE


   Stop: 06/05/19 12:54


   Last Admin: 06/05/19 13:10 Dose:  125 mg


Promethazine HCl/Codeine (Phenergan With Codeine)  5 ml PO NOW STA


   Stop: 06/05/19 14:00


   Last Admin: 06/05/19 14:52 Dose:  5 ml











- Exam


Quality Assessment: Supplemental Oxygen


General: Alert, Oriented, Cooperative, No Acute Distress


Lungs: Rhonchi, Wheezing.  No: Normal Respiratory Effort (dyspneic with speech)


Cardiovascular: Regular Rate, Regular Rhythm


GI/Abdominal Exam: Normal Bowel Sounds, Soft, Non-Tender, No Organomegaly


Back Exam: Normal Inspection, Full Range of Motion


Extremities: Normal Inspection, Normal Range of Motion, Non-Tender, No Pedal 

Edema


Neurological: No New Focal Deficit


Psy/Mental Status: Alert, Normal Affect, Normal Mood





- Problem List & Annotations


(1) Asthma exacerbation


SNOMED Code(s): 328947850


   Code(s): J45.901 - UNSPECIFIED ASTHMA WITH (ACUTE) EXACERBATION   Status: 

Acute   Current Visit: Yes   


Qualifiers: 


   Asthma severity: mild   Asthma persistence: intermittent   Qualified Code(s)

: J45.21 - Mild intermittent asthma with (acute) exacerbation   





(2) Hypoxia


SNOMED Code(s): 091322753


   Code(s): R09.02 - HYPOXEMIA   Status: Acute   Current Visit: Yes   





(3) HTN (hypertension)


SNOMED Code(s): 22867007


   Code(s): I10 - ESSENTIAL (PRIMARY) HYPERTENSION   Status: Chronic   Current 

Visit: Yes   


Qualifiers: 


   Hypertension type: essential hypertension   Qualified Code(s): I10 - 

Essential (primary) hypertension   





(4) Hypothyroidism


SNOMED Code(s): 00781036


   Code(s): E03.9 - HYPOTHYROIDISM, UNSPECIFIED   Status: Chronic   Current 

Visit: Yes   





(5) Ulcerative colitis


SNOMED Code(s): 23299061


   Code(s): K51.90 - ULCERATIVE COLITIS, UNSPECIFIED, WITHOUT COMPLICATIONS   

Status: Chronic   Current Visit: Yes   





- Problem List Review


Problem List Initiated/Reviewed/Updated: Yes





- My Orders


Last 24 Hours: 


My Active Orders





06/05/19 16:17


May Shower [RC] ASDIRECTED 


Oxygen Therapy [RC] PRN 


Up ad Kaelyn [RC] ASDIRECTED 


VTE/DVT Education [RC] PER UNIT ROUTINE 


Vital Signs [RC] Q4H 


Acetaminophen [Tylenol]   650 mg PO Q4H PRN 


Ondansetron [Zofran ODT]   4 mg PO Q4H PRN 


Resuscitation Status Routine 





06/05/19 16:18


Intake and Output [RC] QSHIFT 





06/05/19 16:25


RT Aerosol Therapy [RC] ASDIRECTED 





06/05/19 16:26


Consult to Respiratory Therapy [Respiratory Care Assess and Treatment] [CONS] 

Routine 





06/05/19 16:27


RT Post Treatment Assessment [RC] Click to Edit 


RT Pre-Treatment Assessment [RC] Click to Edit 


Benzonatate [Tessalon Perles]   100 mg PO TID PRN 





06/05/19 16:28


Hyoscyamine [Hyomax-SL]   0.125 mg PO Q8H PRN 





06/05/19 16:30


Heparin Sodium   5,000 units SUBCUT Q12H 





06/05/19 18:00


Albuterol/Ipratropium [DuoNeb 3.0-0.5 MG/3 ML]   3 ml NEB Q4HRRT 





06/05/19 19:00


methylPREDNISolone Sod Succ [Solu-MEDROL]   80 mg IVPUSH Q6H 





06/05/19 21:00


Fluticasone/Salmeterol [Advair Diskus 250-50]   1 puff INH BID 





06/05/19 Dinner


Heart Healthy Diet [DIET] 





06/06/19 07:00


Pantoprazole [ProTONIX***]   40 mg PO DAILY@0700 





06/06/19 08:00


Levothyroxine   25 mcg PO ACBREAKFAST 





06/06/19 09:00


Atenolol [Tenormin]   50 mg PO DAILY 


Citalopram [Celexa]   20 mg PO DAILY 


Losartan [Cozaar]   100 mg PO DAILY 





06/06/19 09:45


Sodium Chloride 0.9% [Normal Saline] 1,000 ml IV ASDIRECTED 





06/07/19 14:00


Levofloxacin/Dextrose 5%-Water [Levaquin in D5W 750 MG/150 ML] 750 mg   Premix 

Bag 1 bag IV Q48H 














- Plan


Plan:: 





79 year old female admitted with  Acute asthma exacerbation and possible 

bronchitis





1. Acute asthma exacerbation:  Slow improvement. Will continue treatment with 

Duoneb, Levaquin 750mg IV q48hr, and Solu-Medrol 80 mg IV Q6hrs. Continue 

Advair 250/50. Consult RT for asthma education/action plan .Oxygen therapy PRN 

to keep sats 90% or higher. Wean off as possible. 





2. HTN: Stable.  Continue home medications, Atenolol and Losartan.





3. Hypothyroidism: Stable, continue Levothyroxine





4. Ulcerative Colitis: Stable, recently had polyps removed during bi annual 

colonoscopy. 





VTE prophylaxis: Heparin 5000 units subcutaneously BID





Dispo: 1-2 days 





Patient would like to be placed as a DNR for code status.

## 2019-06-07 LAB
CHLORIDE SERPL-SCNC: 105 MMOL/L (ref 98–107)
SODIUM SERPL-SCNC: 140 MMOL/L (ref 136–145)

## 2019-06-07 RX ADMIN — METHYLPREDNISOLONE SODIUM SUCCINATE SCH MG: 125 INJECTION, POWDER, FOR SOLUTION INTRAMUSCULAR; INTRAVENOUS at 01:20

## 2019-06-07 RX ADMIN — METHYLPREDNISOLONE SODIUM SUCCINATE SCH MG: 125 INJECTION, POWDER, FOR SOLUTION INTRAMUSCULAR; INTRAVENOUS at 22:05

## 2019-06-07 RX ADMIN — METHYLPREDNISOLONE SODIUM SUCCINATE SCH MG: 125 INJECTION, POWDER, FOR SOLUTION INTRAMUSCULAR; INTRAVENOUS at 06:51

## 2019-06-07 RX ADMIN — METHYLPREDNISOLONE SODIUM SUCCINATE SCH MG: 125 INJECTION, POWDER, FOR SOLUTION INTRAMUSCULAR; INTRAVENOUS at 15:57

## 2019-06-07 RX ADMIN — FLUTICASONE PROPIONATE AND SALMETEROL SCH PUFF: 50; 250 POWDER RESPIRATORY (INHALATION) at 21:16

## 2019-06-07 RX ADMIN — FLUTICASONE PROPIONATE AND SALMETEROL SCH PUFF: 50; 250 POWDER RESPIRATORY (INHALATION) at 09:18

## 2019-06-07 RX ADMIN — DEXTROSE SCH UNITS: 10 SOLUTION INTRAVENOUS at 03:47

## 2019-06-07 NOTE — PCM.PN
- General Info


Date of Service: 06/07/19


Admission Dx/Problem (Free Text): 


 Admission Diagnosis/Problem





Admission Diagnosis/Problem      Hypoxia, new onset afib








Subjective Update: 





Ben complained of chest pain this morning, this was across her shoulders, 

with associated shortness of breath and just not feeling right. She reports 

this has been happening, she saw her Cardiologist and they didn't find 

anything. She states it occurs once a week for the last 6-8 months. 





Prior to this event this morning, she was feeling good to go home. Had a good 

night. Breathing and cough improved.


Functional Status: Reports: Pain Controlled, Tolerating Diet, Ambulating, 

Urinating





- Review of Systems


General: Reports: No Symptoms.  Denies: Weakness, Fatigue, Malaise


Pulmonary: Reports: Shortness of Breath


Cardiovascular: Reports: Chest Pain.  Denies: Edema


Gastrointestinal: Reports: No Symptoms.  Denies: Abdominal Pain, Nausea, 

Vomiting


Genitourinary: Reports: No Symptoms.  Denies: Dysuria, Frequency, Burning


Musculoskeletal: Reports: Shoulder Pain


Skin: Reports: No Symptoms


Neurological: Reports: No Symptoms


Psychiatric: Reports: No Symptoms





- Patient Data


Vitals - Most Recent: 


 Last Vital Signs











Temp  98.4 F   06/07/19 08:43


 


Pulse  144 H  06/07/19 08:49


 


Resp  18   06/07/19 08:43


 


BP  131/71   06/07/19 08:49


 


Pulse Ox  93 L  06/07/19 08:43











Weight - Most Recent: 95.254 kg


I&O - Last 24 Hours: 


 Intake & Output











 06/06/19 06/07/19 06/07/19





 22:59 06:59 14:59


 


Intake Total 1080 650 


 


Output Total 350 1000 


 


Balance 730 -350 











Lab Results Last 24 Hours: 


 Laboratory Results - last 24 hr











  06/07/19 06/07/19 Range/Units





  05:12 05:12 


 


WBC  18.76 H   (4.0-11.0)  K/uL


 


RBC  3.76 L   (4.30-5.90)  M/uL


 


Hgb  11.2 L   (12.0-16.0)  g/dL


 


Hct  35.2 L   (36.0-46.0)  %


 


MCV  93.6   (80.0-98.0)  fL


 


MCH  29.8   (27.0-32.0)  pg


 


MCHC  31.8   (31.0-37.0)  g/dL


 


RDW Std Deviation  43.6   (28.0-62.0)  fl


 


RDW Coeff of Kit  13   (11.0-15.0)  %


 


Plt Count  235   (150-400)  K/uL


 


MPV  9.70   (7.40-12.00)  fL


 


Add Manual Diff  YES   


 


Neutrophils % (Manual)  77   (48.0-80.0)  %


 


Band Neutrophils %  17   %


 


Lymphocytes % (Manual)  2 L   (16.0-40.0)  %


 


Monocytes % (Manual)  3   (0.0-15.0)  %


 


Metamyelocytes %  1   %


 


Nucleated RBC %  0.0   /100WBC


 


Absolute Seg Neuts  14.4 H   (1.4-5.7)  


 


Band Neutrophils #  3.2   


 


Lymphocytes # (Manual)  0.4 L   (0.6-2.4)  


 


Monocytes # (Manual)  0.6   (0.0-0.8)  


 


Absolute Metamyelocyte  0.2   


 


Nucleated RBCs #  0   K/uL


 


Sodium   140  (136-145)  mmol/L


 


Potassium   3.8  (3.5-5.1)  mmol/L


 


Chloride   105  ()  mmol/L


 


Carbon Dioxide   27.1  (21.0-32.0)  mmol/L


 


BUN   27 H  (7.0-18.0)  mg/dL


 


Creatinine   1.0  (0.6-1.0)  mg/dL


 


Est Cr Clr Drug Dosing   41.72  mL/min


 


Estimated GFR (MDRD)   53.6  ml/min


 


Glucose   170 H  ()  mg/dL


 


Calcium   9.5  (8.5-10.1)  mg/dL











Robert Results Last 24 Hours: 


 Microbiology











 06/05/19 14:32 Gram Stain - Final





 Sputum - Expectorated 











Med Orders - Current: 


 Current Medications





Acetaminophen (Tylenol)  650 mg PO Q4H PRN


   PRN Reason: Pain (mild 1-3)


Albuterol/Ipratropium (Duoneb 3.0-0.5 Mg/3 Ml)  3 ml NEB Q4HRRT [facility]


   Last Admin: 06/07/19 06:13 Dose:  3 ml


Atenolol (Tenormin)  50 mg PO DAILY [facility]


   Last Admin: 06/07/19 08:49 Dose:  50 mg


Benzonatate (Tessalon Perles)  100 mg PO TID PRN


   PRN Reason: Cough


Citalopram Hydrobromide (Celexa)  20 mg PO DAILY [facility]


   Last Admin: 06/07/19 08:49 Dose:  20 mg


Heparin Sodium (Porcine) (Heparin Sodium)  5,000 units SUBCUT Q12H [facility]


   Last Admin: 06/07/19 03:47 Dose:  5,000 units


Hyoscyamine (Hyomax-Sl)  0.125 mg PO Q8H PRN


   PRN Reason: Abdominal Pain


Levofloxacin/Dextrose 750 mg/ (Premix)  150 mls @ 100 mls/hr IV Q48H [facility]


Sodium Chloride (Normal Saline)  1,000 mls @ 100 mls/hr IV ASDIRECTED [facility]


Levothyroxine Sodium (Levothyroxine)  25 mcg PO ACBREAKFAST [facility]


   Last Admin: 06/07/19 06:51 Dose:  25 mcg


Losartan Potassium (Cozaar)  100 mg PO DAILY [facility]


   Last Admin: 06/07/19 08:49 Dose:  100 mg


Methylprednisolone Sodium Succinate (Solu-Medrol)  80 mg IVPUSH Q6H [facility]


   Last Admin: 06/07/19 06:51 Dose:  80 mg


Ondansetron HCl (Zofran Odt)  4 mg PO Q4H PRN


   PRN Reason: nausea, able to take PO


Pantoprazole Sodium (Protonix***)  40 mg PO DAILY@0700 [facility]


   Last Admin: 06/07/19 06:51 Dose:  40 mg


Fluticasone/Salmeterol (Advair Diskus 250-50)  1 puff INH BID [facility]


   Last Admin: 06/07/19 09:18 Dose:  1 puff


Sodium Chloride (Saline Flush)  10 ml FLUSH ASDIRECTED PRN


   PRN Reason: Keep Vein Open


Sodium Chloride (Saline Flush)  2.5 ml FLUSH ASDIRECTED PRN


   PRN Reason: Keep Vein Open





Discontinued Medications





Albuterol/Ipratropium (Duoneb 3.0-0.5 Mg/3 Ml)  3 ml NEB ONETIME ONE


   Stop: 06/05/19 12:54


   Last Admin: 06/05/19 13:06 Dose:  3 ml


Albuterol/Ipratropium (Duoneb 3.0-0.5 Mg/3 Ml)  3 ml NEB ONETIME ONE


   Stop: 06/05/19 13:12


   Last Admin: 06/05/19 13:16 Dose:  3 ml


Albuterol/Ipratropium (Duoneb 3.0-0.5 Mg/3 Ml) Confirm Administered Dose 3 ml 

.ROUTE .STK-MED ONE


   Stop: 06/05/19 13:13


   Last Admin: 06/05/19 13:15 Dose:  Not Given


Diltiazem HCl (Diltiazem)  20 mg IVPUSH ONETIME ONE


   Stop: 06/07/19 09:21


   Last Admin: 06/07/19 09:31 Dose:  20 mg


Levofloxacin/Dextrose 750 mg/ (Premix)  150 mls @ 100 mls/hr IV ONETIME ONE


   Stop: 06/05/19 15:28


   Last Admin: 06/05/19 14:44 Dose:  100 mls/hr


Methylprednisolone Sodium Succinate (Solu-Medrol)  125 mg IVPUSH ONETIME ONE


   Stop: 06/05/19 12:54


   Last Admin: 06/05/19 13:10 Dose:  125 mg


Promethazine HCl/Codeine (Phenergan With Codeine)  5 ml PO NOW STA


   Stop: 06/05/19 14:00


   Last Admin: 06/05/19 14:52 Dose:  5 ml











- Exam


Quality Assessment: DVT Prophylaxis.  No: Supplemental Oxygen


General: Alert, Oriented, Cooperative


Lungs: Clear to Auscultation.  No: Normal Respiratory Effort (dyspnea), Wheezing


Cardiovascular: Irregular Rhythm, Tachycardia


GI/Abdominal Exam: Normal Bowel Sounds, Soft, Non-Tender


Extremities: Normal Inspection, Normal Range of Motion, Non-Tender, No Pedal 

Edema


Neurological: No New Focal Deficit


Psy/Mental Status: Alert, Normal Affect, Normal Mood





- Problem List & Annotations


(1) New onset atrial fibrillation


SNOMED Code(s): 16396558


   Code(s): I48.91 - UNSPECIFIED ATRIAL FIBRILLATION   Status: Acute   Current 

Visit: Yes   





(2) Asthma exacerbation


SNOMED Code(s): 945315849


   Code(s): J45.901 - UNSPECIFIED ASTHMA WITH (ACUTE) EXACERBATION   Status: 

Acute   Current Visit: Yes   


Qualifiers: 


   Asthma severity: mild   Asthma persistence: intermittent   Qualified Code(s)

: J45.21 - Mild intermittent asthma with (acute) exacerbation   





(3) Hypoxia


SNOMED Code(s): 490460019


   Code(s): R09.02 - HYPOXEMIA   Status: Acute   Current Visit: Yes   





(4) HTN (hypertension)


SNOMED Code(s): 83959968


   Code(s): I10 - ESSENTIAL (PRIMARY) HYPERTENSION   Status: Chronic   Current 

Visit: Yes   


Qualifiers: 


   Hypertension type: essential hypertension   Qualified Code(s): I10 - 

Essential (primary) hypertension   





(5) Hypothyroidism


SNOMED Code(s): 39822559


   Code(s): E03.9 - HYPOTHYROIDISM, UNSPECIFIED   Status: Chronic   Current 

Visit: Yes   





(6) Ulcerative colitis


SNOMED Code(s): 75220874


   Code(s): K51.90 - ULCERATIVE COLITIS, UNSPECIFIED, WITHOUT COMPLICATIONS   

Status: Chronic   Current Visit: Yes   





- Problem List Review


Problem List Initiated/Reviewed/Updated: Yes





- My Orders


Last 24 Hours: 


My Active Orders





06/06/19 09:00


Atenolol [Tenormin]   50 mg PO DAILY 


Citalopram [Celexa]   20 mg PO DAILY 


Losartan [Cozaar]   100 mg PO DAILY 





06/06/19 09:45


Sodium Chloride 0.9% [Normal Saline] 1,000 ml IV ASDIRECTED 





06/07/19 08:43


EKG Documentation Completion [RC] STAT 





06/07/19 08:44


Telemetry Monitoring [Cardiac Monitoring] [RC] .AS DIRECTED 





06/07/19 09:09


TROPONIN I [CHEM] Stat 





06/07/19 09:30


Patient Status [ADT] Stat 





06/07/19 14:00


Levofloxacin/Dextrose 5%-Water [Levaquin in D5W 750 MG/150 ML] 750 mg   Premix 

Bag 1 bag IV Q48H 














- Plan


Plan:: 





79 year old female admitted with  Acute asthma exacerbation and possible 

bronchitis





1. Acute asthma exacerbation:  Steady improvement, weaned off oxygen. Will 

continue treatment with Duoneb, Levaquin 750mg IV q48hr, and will change Solu-

Medrol 80 mg IV to Q8hrs. Continue Advair 250/50. Consult RT for asthma 

education/action plan .Oxygen therapy PRN to keep sats 90% or higher. Encourage 

IS





2. New onset atrial fibrillation: Trend troponins due to chest pain, first was 

negative. EKG revealed Afib, given Diltiazem 20 mg IV, slow rate to low 100s. 

Will start PO Cardiazem IR 30 mg q6hr. Discussed anticoagulation and she is 

willing to start Eliquis. CHADSVASC score is 5. Will check Magnesium and TSH. 

Obtain ECHO as well. 





3. HTN: Stable.  Continue home medications, Atenolol and Losartan.





4. Hypothyroidism: Stable, continue Levothyroxine





5. Ulcerative Colitis: Stable, recently had polyps removed during bi annual 

colonoscopy. 





VTE prophylaxis: Started on Eliquis





Dispo: Due to new onset afib, will change to inpatient status.





Patient would like to be placed as a DNR for code status.

## 2019-06-08 VITALS — DIASTOLIC BLOOD PRESSURE: 68 MMHG | SYSTOLIC BLOOD PRESSURE: 152 MMHG

## 2019-06-08 LAB
CHLORIDE SERPL-SCNC: 104 MMOL/L (ref 98–107)
HBA1C BLD-MCNC: 5.7 % (ref 4.5–6.2)
SODIUM SERPL-SCNC: 138 MMOL/L (ref 136–145)

## 2019-06-08 RX ADMIN — FLUTICASONE PROPIONATE AND SALMETEROL SCH PUFF: 50; 250 POWDER RESPIRATORY (INHALATION) at 08:04

## 2019-06-08 RX ADMIN — METHYLPREDNISOLONE SODIUM SUCCINATE SCH MG: 125 INJECTION, POWDER, FOR SOLUTION INTRAMUSCULAR; INTRAVENOUS at 06:45

## 2019-06-08 NOTE — [id number]
<Chris Barillas - Last Filed: 06/08/19 10:51>





**Discharge Summary





- Hospital Course


Free Text/Narrative:: 





79 y/ female with history of asthma, hypothyroidism who presented to the ER 

complaining of shortness of breath. States that she left her medication back in 

[place]. She was found to be hypoxic in high 80's on room in the ER. She was 

admitted for acute asthma exacerbation. She was started on methylprednisolone 

and scheduled Duoneb treatments which seemed to help her symptoms. In addition, 

during this hospitalization she was found to have new onset Afib. She was 

started on diltiazem and Eliquis and she converted to sinus rhythm and remained 

rate controlled. She was discharged home on Eliquis 5 mg PO BID, Diltiazem 120 

ER PO daily, Advair and a prednisone taper. She was advised to follow-up with 

her PCP after discharge. 





- Discharge Data


Discharge Date: 06/08/19


Discharge Disposition: Home, Self-Care 01


Condition: Good





- Patient Summary/Data


Consults: 


 Consultations





06/05/19 16:26


Consult to Respiratory Therapy [Respiratory Care Assess and Treatment] [CONS] 

Routine 














- Patient Instructions


Diet: Heart Healthy Diet


Activity: As Tolerated


Notify Provider of: Fever, Increased Pain, Swelling and Redness, Nausea and/or 

Vomiting





- Discharge Plan


*PRESCRIPTION DRUG MONITORING PROGRAM REVIEWED*: Not Applicable


*COPY OF PRESCRIPTION DRUG MONITORING REPORT IN PATIENT BIANKA: Not Applicable


Prescriptions/Med Rec: 


Apixaban [Eliquis] 5 mg PO BID #60 tablet


Diltiazem HCl [Diltiazem 24Hr ER] 120 mg PO DAILY #30 cap.er.24h


Fluticasone/Salmeterol [Advair 250-50] 1 puff INH BID #1 diskus


predniSONE [Prednisone] See Taper PO DAILY #6 tablet


Home Medications: 


 Home Meds





Albuterol [Proair HFA] 2 inhalation IH Q4H PRN 09/10/14 [History]


Atenolol 50 mg PO DAILY 09/10/14 [History]


Citalopram [Citalopram HBr] 20 mg PO DAILY 09/10/14 [History]


Hyoscyamine [Hyomax-SL] 1 tab PO Q8H PRN 06/05/19 [History]


Levothyroxine 25 mcg PO DAILY 06/05/19 [History]


Losartan [Cozaar] 100 mg PO DAILY 06/05/19 [History]


Pantoprazole [ProTONIX***] 40 mg PO DAILY 06/05/19 [History]


Apixaban [Eliquis] 5 mg PO BID #60 tablet 06/08/19 [Rx]


Diltiazem HCl [Diltiazem 24Hr ER] 120 mg PO DAILY #30 cap.er.24h 06/08/19 [Rx]


Fluticasone/Salmeterol [Advair 250-50] 1 puff INH BID #1 diskus 06/08/19 [Rx]


predniSONE [Prednisone] See Taper PO DAILY #6 tablet 06/08/19 [Rx]








Patient Handouts:  Diltiazem extended-release capsules or tablets, Prednisone 

tablets, Apixaban oral tablets, Atrial Fibrillation, Easy-to-Read, Fluticasone; 

Salmeterol inhalation aerosol


Referrals: 


[facility] [Outside]


Darby New MD [Ordering Only Provider] - 06/19/19 12:30 pm (Please come in 

20 minutes before the scheduled appointment. )





- Discharge Summary/Plan Comment


DC Time >30 min.: No





- Patient Data


Vitals - Most Recent: 


 Last Vital Signs











Temp  36.9 C   06/08/19 07:15


 


Pulse  85   06/08/19 08:34


 


Resp  14   06/08/19 07:15


 


BP  191/78 H  06/08/19 08:35


 


Pulse Ox  93 L  06/08/19 07:15











Weight - Most Recent: 95.254 kg


I&O - Last 24 hours: 


 Intake & Output











 06/07/19 06/08/19 06/08/19





 22:59 06:59 14:59


 


Intake Total 200 725 


 


Output Total  300 


 


Balance 200 425 











Lab Results - Last 24 hrs: 


 Laboratory Results - last 24 hr











  06/07/19 06/07/19 06/07/19 Range/Units





  09:09 15:00 21:08 


 


WBC     (4.0-11.0)  K/uL


 


RBC     (4.30-5.90)  M/uL


 


Hgb     (12.0-16.0)  g/dL


 


Hct     (36.0-46.0)  %


 


MCV     (80.0-98.0)  fL


 


MCH     (27.0-32.0)  pg


 


MCHC     (31.0-37.0)  g/dL


 


RDW Std Deviation     (28.0-62.0)  fl


 


RDW Coeff of Kit     (11.0-15.0)  %


 


Plt Count     (150-400)  K/uL


 


MPV     (7.40-12.00)  fL


 


Add Manual Diff     


 


Neutrophils % (Manual)     (48.0-80.0)  %


 


Band Neutrophils %     %


 


Lymphocytes % (Manual)     (16.0-40.0)  %


 


Monocytes % (Manual)     (0.0-15.0)  %


 


Nucleated RBC %     /100WBC


 


Absolute Seg Neuts     (1.4-5.7)  


 


Band Neutrophils #     


 


Lymphocytes # (Manual)     (0.6-2.4)  


 


Monocytes # (Manual)     (0.0-0.8)  


 


Nucleated RBCs #     K/uL


 


Sodium     (136-145)  mmol/L


 


Potassium     (3.5-5.1)  mmol/L


 


Chloride     ()  mmol/L


 


Carbon Dioxide     (21.0-32.0)  mmol/L


 


BUN     (7.0-18.0)  mg/dL


 


Creatinine     (0.6-1.0)  mg/dL


 


Est Cr Clr Drug Dosing     mL/min


 


Estimated GFR (MDRD)     ml/min


 


Glucose     ()  mg/dL


 


Hemoglobin A1c     (4.5-6.2)  %


 


Calcium     (8.5-10.1)  mg/dL


 


Magnesium  1.6 L    (1.8-2.4)  mg/dL


 


Troponin I   0.056  0.054  (0.000-0.056)  ng/mL


 


Triglycerides     (0-200)  mg/dL


 


Cholesterol     ()  mg/dL


 


LDL Cholesterol, Calc     ()  mg/dL


 


VLDL Cholesterol     (5-55)  mg/dL


 


HDL Cholesterol     (40-60)  mg/dL


 


Cholesterol/HDL Ratio     (3.3-6.0)  


 


TSH 3rd Generation  0.36    (0.36-3.74)  uIU/mL














  06/08/19 06/08/19 06/08/19 Range/Units





  05:50 05:50 05:50 


 


WBC  15.16 H    (4.0-11.0)  K/uL


 


RBC  3.70 L    (4.30-5.90)  M/uL


 


Hgb  11.0 L    (12.0-16.0)  g/dL


 


Hct  34.5 L    (36.0-46.0)  %


 


MCV  93.2    (80.0-98.0)  fL


 


MCH  29.7    (27.0-32.0)  pg


 


MCHC  31.9    (31.0-37.0)  g/dL


 


RDW Std Deviation  44.3    (28.0-62.0)  fl


 


RDW Coeff of Kit  13    (11.0-15.0)  %


 


Plt Count  222    (150-400)  K/uL


 


MPV  9.60    (7.40-12.00)  fL


 


Add Manual Diff  YES    


 


Neutrophils % (Manual)  84 H    (48.0-80.0)  %


 


Band Neutrophils %  10    %


 


Lymphocytes % (Manual)  4 L    (16.0-40.0)  %


 


Monocytes % (Manual)  2    (0.0-15.0)  %


 


Nucleated RBC %  0.0    /100WBC


 


Absolute Seg Neuts  12.7 H    (1.4-5.7)  


 


Band Neutrophils #  1.5    


 


Lymphocytes # (Manual)  0.6    (0.6-2.4)  


 


Monocytes # (Manual)  0.3    (0.0-0.8)  


 


Nucleated RBCs #  0    K/uL


 


Sodium   138   (136-145)  mmol/L


 


Potassium   4.4   (3.5-5.1)  mmol/L


 


Chloride   104   ()  mmol/L


 


Carbon Dioxide   27.2   (21.0-32.0)  mmol/L


 


BUN   36 H   (7.0-18.0)  mg/dL


 


Creatinine   1.0   (0.6-1.0)  mg/dL


 


Est Cr Clr Drug Dosing   41.72   mL/min


 


Estimated GFR (MDRD)   53.6   ml/min


 


Glucose   146 H   ()  mg/dL


 


Hemoglobin A1c    5.7  (4.5-6.2)  %


 


Calcium   9.1   (8.5-10.1)  mg/dL


 


Magnesium   2.1   (1.8-2.4)  mg/dL


 


Troponin I     (0.000-0.056)  ng/mL


 


Triglycerides     (0-200)  mg/dL


 


Cholesterol     ()  mg/dL


 


LDL Cholesterol, Calc     ()  mg/dL


 


VLDL Cholesterol     (5-55)  mg/dL


 


HDL Cholesterol     (40-60)  mg/dL


 


Cholesterol/HDL Ratio     (3.3-6.0)  


 


TSH 3rd Generation     (0.36-3.74)  uIU/mL














  06/08/19 Range/Units





  05:50 


 


WBC   (4.0-11.0)  K/uL


 


RBC   (4.30-5.90)  M/uL


 


Hgb   (12.0-16.0)  g/dL


 


Hct   (36.0-46.0)  %


 


MCV   (80.0-98.0)  fL


 


MCH   (27.0-32.0)  pg


 


MCHC   (31.0-37.0)  g/dL


 


RDW Std Deviation   (28.0-62.0)  fl


 


RDW Coeff of Kit   (11.0-15.0)  %


 


Plt Count   (150-400)  K/uL


 


MPV   (7.40-12.00)  fL


 


Add Manual Diff   


 


Neutrophils % (Manual)   (48.0-80.0)  %


 


Band Neutrophils %   %


 


Lymphocytes % (Manual)   (16.0-40.0)  %


 


Monocytes % (Manual)   (0.0-15.0)  %


 


Nucleated RBC %   /100WBC


 


Absolute Seg Neuts   (1.4-5.7)  


 


Band Neutrophils #   


 


Lymphocytes # (Manual)   (0.6-2.4)  


 


Monocytes # (Manual)   (0.0-0.8)  


 


Nucleated RBCs #   K/uL


 


Sodium   (136-145)  mmol/L


 


Potassium   (3.5-5.1)  mmol/L


 


Chloride   ()  mmol/L


 


Carbon Dioxide   (21.0-32.0)  mmol/L


 


BUN   (7.0-18.0)  mg/dL


 


Creatinine   (0.6-1.0)  mg/dL


 


Est Cr Clr Drug Dosing   mL/min


 


Estimated GFR (MDRD)   ml/min


 


Glucose   ()  mg/dL


 


Hemoglobin A1c   (4.5-6.2)  %


 


Calcium   (8.5-10.1)  mg/dL


 


Magnesium   (1.8-2.4)  mg/dL


 


Troponin I   (0.000-0.056)  ng/mL


 


Triglycerides  93  (0-200)  mg/dL


 


Cholesterol  145  ()  mg/dL


 


LDL Cholesterol, Calc  83  ()  mg/dL


 


VLDL Cholesterol  18  (5-55)  mg/dL


 


HDL Cholesterol  43  (40-60)  mg/dL


 


Cholesterol/HDL Ratio  3.4  (3.3-6.0)  


 


TSH 3rd Generation   (0.36-3.74)  uIU/mL











ADDISON Results - Last 24 hrs: 


 Microbiology











 06/05/19 14:32 Gram Stain - Final





 Sputum - Expectorated Sputum Culture - Final





    Normal Respiratory Yuli











Med Orders - Current: 


 Current Medications





Acetaminophen (Tylenol)  650 mg PO Q4H PRN


   PRN Reason: Pain (mild 1-3)


Albuterol/Ipratropium (Duoneb 3.0-0.5 Mg/3 Ml)  3 ml NEB Q4HRRT Formerly [facility], 1928–1983


   Last Admin: 06/08/19 09:46 Dose:  3 ml


Apixaban (Eliquis)  5 mg PO BID Formerly [facility], 1928–1983


   Last Admin: 06/08/19 08:34 Dose:  5 mg


Atenolol (Tenormin)  50 mg PO DAILY Formerly [facility], 1928–1983


   Last Admin: 06/08/19 08:34 Dose:  50 mg


Benzonatate (Tessalon Perles)  100 mg PO TID PRN


   PRN Reason: Cough


Citalopram Hydrobromide (Celexa)  20 mg PO DAILY Formerly [facility], 1928–1983


   Last Admin: 06/08/19 08:35 Dose:  20 mg


Diltiazem HCl (Cardizem)  30 mg PO Q6HR Formerly [facility], 1928–1983


   Last Admin: 06/08/19 06:46 Dose:  30 mg


Hyoscyamine (Hyomax-Sl)  0.125 mg PO Q8H PRN


   PRN Reason: Abdominal Pain


Levofloxacin/Dextrose 750 mg/ (Premix)  150 mls @ 100 mls/hr IV Q48H Formerly [facility], 1928–1983


   Last Admin: 06/07/19 14:16 Dose:  100 mls/hr


Levothyroxine Sodium (Levothyroxine)  25 mcg PO ACBREAKFAST Formerly [facility], 1928–1983


   Last Admin: 06/08/19 06:45 Dose:  25 mcg


Losartan Potassium (Cozaar)  100 mg PO DAILY Formerly [facility], [facility]–1983


   Last Admin: 06/08/19 08:35 Dose:  100 mg


Methylprednisolone Sodium Succinate (Solu-Medrol)  80 mg IVPUSH Q8H Formerly [facility], 1928–1983


   Last Admin: 06/08/19 06:45 Dose:  80 mg


Ondansetron HCl (Zofran Odt)  4 mg PO Q4H PRN


   PRN Reason: nausea, able to take PO


Pantoprazole Sodium (Protonix***)  40 mg PO DAILY@0700 Former[facility], [facility]–1983


   Last Admin: 06/08/19 06:45 Dose:  40 mg


Fluticasone/Salmeterol (Advair Diskus 250-50)  1 puff INH BID Formerly [facility], 1928–1983


   Last Admin: 06/08/19 08:04 Dose:  1 puff


Sodium Chloride (Saline Flush)  10 ml FLUSH ASDIRECTED PRN


   PRN Reason: Keep Vein Open


Sodium Chloride (Saline Flush)  2.5 ml FLUSH ASDIRECTED PRN


   PRN Reason: Keep Vein Open





Discontinued Medications





Albuterol/Ipratropium (Duoneb 3.0-0.5 Mg/3 Ml)  3 ml NEB ONETIME ONE


   Stop: 06/05/19 12:54


   Last Admin: 06/05/19 13:06 Dose:  3 ml


Albuterol/Ipratropium (Duoneb 3.0-0.5 Mg/3 Ml)  3 ml NEB ONETIME ONE


   Stop: 06/05/19 13:12


   Last Admin: 06/05/19 13:16 Dose:  3 ml


Albuterol/Ipratropium (Duoneb 3.0-0.5 Mg/3 Ml) Confirm Administered Dose 3 ml 

.ROUTE .STK-MED ONE


   Stop: 06/05/19 13:13


   Last Admin: 06/05/19 13:15 Dose:  Not Given


Apixaban (Eliquis) Confirm Administered Dose 5 mg .ROUTE .STK-MED ONE


   Stop: 06/07/19 10:50


   Last Admin: 06/07/19 11:03 Dose:  Not Given


Diltiazem HCl (Diltiazem)  20 mg IVPUSH ONETIME ONE


   Stop: 06/07/19 09:21


   Last Admin: 06/07/19 09:31 Dose:  20 mg


Heparin Sodium (Porcine) (Heparin Sodium)  5,000 units SUBCUT Q12H Formerly [facility], 1928–1983


   Last Admin: 06/07/19 03:47 Dose:  5,000 units


Levofloxacin/Dextrose 750 mg/ (Premix)  150 mls @ 100 mls/hr IV ONETIME ONE


   Stop: 06/05/19 15:28


   Last Admin: 06/05/19 14:44 Dose:  100 mls/hr


Sodium Chloride (Normal Saline)  1,000 mls @ 100 mls/hr IV ASDIRECTED Formerly [facility], [facility]–1983


Magnesium Sulfate 2 gm/ Premix  50 mls @ 50 mls/hr IV ONETIME ONE


   Stop: 06/07/19 12:52


   Last Admin: 06/07/19 13:11 Dose:  50 mls/hr


Methylprednisolone Sodium Succinate (Solu-Medrol)  125 mg IVPUSH ONETIME ONE


   Stop: 06/05/19 12:54


   Last Admin: 06/05/19 13:10 Dose:  125 mg


Methylprednisolone Sodium Succinate (Solu-Medrol)  80 mg IVPUSH Q6H BRIAN


   Last Admin: 06/07/19 06:51 Dose:  80 mg


Potassium Chloride (Klor-Con M20)  40 meq PO ONETIME ONE


   Stop: 06/07/19 11:54


   Last Admin: 06/07/19 13:12 Dose:  40 meq


Promethazine HCl/Codeine (Phenergan With Codeine)  5 ml PO NOW STA


   Stop: 06/05/19 14:00


   Last Admin: 06/05/19 14:52 Dose:  5 ml











<Nino Ferguson - Last Filed: 06/09/19 19:26>





**Discharge Summary





- Patient Summary/Data


Consults: 


 Consultations





06/05/19 16:26


Consult to Respiratory Therapy [Respiratory Care Assess and Treatment] [CONS] 

Routine 














- Patient Data


Vitals - Most Recent: 


 Last Vital Signs











Temp  36.9 C   06/08/19 07:15


 


Pulse  68   06/08/19 12:07


 


Resp  18   06/08/19 12:07


 


BP  152/68 H  06/08/19 12:07


 


Pulse Ox  91 L  06/08/19 12:07











Med Orders - Current: 


 Current Medications








Discontinued Medications





Acetaminophen (Tylenol)  650 mg PO Q4H PRN


   PRN Reason: Pain (mild 1-3)


Albuterol/Ipratropium (Duoneb 3.0-0.5 Mg/3 Ml)  3 ml NEB ONETIME ONE


   Stop: 06/05/19 12:54


   Last Admin: 06/05/19 13:06 Dose:  3 ml


Albuterol/Ipratropium (Duoneb 3.0-0.5 Mg/3 Ml)  3 ml NEB ONETIME ONE


   Stop: 06/05/19 13:12


   Last Admin: 06/05/19 13:16 Dose:  3 ml


Albuterol/Ipratropium (Duoneb 3.0-0.5 Mg/3 Ml) Confirm Administered Dose 3 ml 

.ROUTE .STK-MED ONE


   Stop: 06/05/19 13:13


   Last Admin: 06/05/19 13:15 Dose:  Not Given


Albuterol/Ipratropium (Duoneb 3.0-0.5 Mg/3 Ml)  3 ml NEB Q4HRRT Formerly [facility], 1928–1983


   Last Admin: 06/08/19 09:46 Dose:  3 ml


Apixaban (Eliquis)  5 mg PO BID Formerly [facility], 1928–1983


   Last Admin: 06/08/19 08:34 Dose:  5 mg


Apixaban (Eliquis) Confirm Administered Dose 5 mg .ROUTE .STK-MED ONE


   Stop: 06/07/19 10:50


   Last Admin: 06/07/19 11:03 Dose:  Not Given


Atenolol (Tenormin)  50 mg PO DAILY Formerly [facility], 1928–1983


   Last Admin: 06/08/19 08:34 Dose:  50 mg


Benzonatate (Tessalon Perles)  100 mg PO TID PRN


   PRN Reason: Cough


Citalopram Hydrobromide (Celexa)  20 mg PO DAILY Former[facility], 1928–1983


   Last Admin: 06/08/19 08:35 Dose:  20 mg


Diltiazem HCl (Diltiazem)  20 mg IVPUSH ONETIME ONE


   Stop: 06/07/19 09:21


   Last Admin: 06/07/19 09:31 Dose:  20 mg


Diltiazem HCl (Cardizem)  30 mg PO Q6HR Former[facility], 1928–1983


   Last Admin: 06/08/19 06:46 Dose:  30 mg


Heparin Sodium (Porcine) (Heparin Sodium)  5,000 units SUBCUT Q12H Former[facility], 1928–1983


   Last Admin: 06/07/19 03:47 Dose:  5,000 units


Hyoscyamine (Hyomax-Sl)  0.125 mg PO Q8H PRN


   PRN Reason: Abdominal Pain


Levofloxacin/Dextrose 750 mg/ (Premix)  150 mls @ 100 mls/hr IV ONETIME ONE


   Stop: 06/05/19 15:28


   Last Admin: 06/05/19 14:44 Dose:  100 mls/hr


Levofloxacin/Dextrose 750 mg/ (Premix)  150 mls @ 100 mls/hr IV Q48H Former[facility], 1928–1983


   Last Admin: 06/07/19 14:16 Dose:  100 mls/hr


Sodium Chloride (Normal Saline)  1,000 mls @ 100 mls/hr IV ASDIRECTED Former[facility], [facility]–1983


Magnesium Sulfate 2 gm/ Premix  50 mls @ 50 mls/hr IV ONETIME ONE


   Stop: 06/07/19 12:52


   Last Admin: 06/07/19 13:11 Dose:  50 mls/hr


Levothyroxine Sodium (Levothyroxine)  25 mcg PO ACBREAKFAST Former[facility], 1928–1983


   Last Admin: 06/08/19 06:45 Dose:  25 mcg


Losartan Potassium (Cozaar)  100 mg PO DAILY Former[facility], [facility]–1983


   Last Admin: 06/08/19 08:35 Dose:  100 mg


Methylprednisolone Sodium Succinate (Solu-Medrol)  125 mg IVPUSH ONETIME ONE


   Stop: 06/05/19 12:54


   Last Admin: 06/05/19 13:10 Dose:  125 mg


Methylprednisolone Sodium Succinate (Solu-Medrol)  80 mg IVPUSH Q6H Former[facility], [facility]–1983


   Last Admin: 06/07/19 06:51 Dose:  80 mg


Methylprednisolone Sodium Succinate (Solu-Medrol)  80 mg IVPUSH Q8H [facility], [facility]–1983


   Last Admin: 06/08/19 06:45 Dose:  80 mg


Ondansetron HCl (Zofran Odt)  4 mg PO Q4H PRN


   PRN Reason: nausea, able to take PO


Pantoprazole Sodium (Protonix***)  40 mg PO DAILY@0700 Former[facility], [facility]–1983


   Last Admin: 06/08/19 06:45 Dose:  40 mg


Potassium Chloride (Klor-Con M20)  40 meq PO ONETIME ONE


   Stop: 06/07/19 11:54


   Last Admin: 06/07/19 13:12 Dose:  40 meq


Promethazine HCl/Codeine (Phenergan With Codeine)  5 ml PO NOW STA


   Stop: 06/05/19 14:00


   Last Admin: 06/05/19 14:52 Dose:  5 ml


Fluticasone/Salmeterol (Advair Diskus 250-50)  1 puff INH BID Formerly [facility], 1928–1983


   Last Admin: 06/08/19 08:04 Dose:  1 puff


Sodium Chloride (Saline Flush)  10 ml FLUSH ASDIRECTED PRN


   PRN Reason: Keep Vein Open


Sodium Chloride (Saline Flush)  2.5 ml FLUSH ASDIRECTED PRN


   PRN Reason: Keep Vein Open











- Free Text/Narrative


Note: 





I have examined the patient.  I have discussed findings and treatment plan with 

the resident.  I agree with the assessment and plan outlined in the following 

resident's note.